# Patient Record
Sex: MALE | Race: WHITE
[De-identification: names, ages, dates, MRNs, and addresses within clinical notes are randomized per-mention and may not be internally consistent; named-entity substitution may affect disease eponyms.]

---

## 2018-05-18 ENCOUNTER — HOSPITAL ENCOUNTER (EMERGENCY)
Dept: HOSPITAL 26 - MED | Age: 33
Discharge: HOME | End: 2018-05-18
Payer: COMMERCIAL

## 2018-05-18 VITALS — BODY MASS INDEX: 33.27 KG/M2 | WEIGHT: 212 LBS | HEIGHT: 67 IN

## 2018-05-18 VITALS — SYSTOLIC BLOOD PRESSURE: 148 MMHG | DIASTOLIC BLOOD PRESSURE: 87 MMHG

## 2018-05-18 VITALS — SYSTOLIC BLOOD PRESSURE: 122 MMHG | DIASTOLIC BLOOD PRESSURE: 53 MMHG
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
## 2018-05-18 DIAGNOSIS — T17.228A: Primary | ICD-10-CM
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
## 2018-05-18 NOTE — NUR
DR PLUMMER ATTEMPTED TO RETRIEVE FOREIGN BODY FROM THROAT, UNSUCCESSFUL DUE TO 
PT ATTEMPTING TO GAG AFTER EACH ATTEMPT. MD TO ORDER NUMBING SPRAY, WILL CARRY 
OUT.

## 2018-05-18 NOTE — NUR
DR PLUMMER ATTEMPTED ADMINISTERED HURRICANE SPRAY TO NUMB THROAT, MD UNABLE TO 
RETRIEVE FOREIGN BODY FROM PT'S THROAT DUE TO PT GAGGING UPON ATTEMPT. MD 
ORDERED NITRO SL, WILL CARRY OUT.

## 2018-05-18 NOTE — NUR
32/M CAME IN ED, C/O 3/10 SHARP THROAT PAIN SINCE 2000, PT STATED "I SWALLOW A 
FISHBONE." PT REPORTS FEELING THE FISHBONE STUCK IN HIS THROAT. UNABLE TO 
VISUALLY SEE FOREIGN OBJECT FROM THROAT. PT DENIES MED HX, RX. NKA. PT DENIES 
N/V/D; SKIN IS INTACT, PINK/WARM/DRY; AAOX4, PERRL, WITH EVEN AND STEADY GAIT; 
LUNGS CLEAR BL, BREATHING UNLABORED; HR EVEN AND REGULAR, BL PERIPHERAL PULSES 
PRESENT; BS ACTIVE X4, NO TENDERNESS TO PALPATION; PT DENIES ANY FEVER, CP, 
SOB, OR COUGH AT THIS TIME; PT STATES 0/10 PAIN AT THIS TIME; VSS; PATIENT 
POSITIONED FOR COMFORT; HOB ELEVATED; BEDRAILS UP X2; BED DOWN. ER MD DR PLUMMER AWARE.

## 2018-05-19 ENCOUNTER — HOSPITAL ENCOUNTER (EMERGENCY)
Dept: HOSPITAL 26 - MED | Age: 33
Discharge: HOME | End: 2018-05-19
Payer: COMMERCIAL

## 2018-05-19 VITALS — HEIGHT: 67 IN | BODY MASS INDEX: 32.96 KG/M2 | WEIGHT: 210 LBS

## 2018-05-19 VITALS — SYSTOLIC BLOOD PRESSURE: 145 MMHG | DIASTOLIC BLOOD PRESSURE: 86 MMHG

## 2018-05-19 VITALS — SYSTOLIC BLOOD PRESSURE: 136 MMHG | DIASTOLIC BLOOD PRESSURE: 82 MMHG

## 2018-05-19 DIAGNOSIS — R09.89: Primary | ICD-10-CM
